# Patient Record
Sex: FEMALE | Race: WHITE
[De-identification: names, ages, dates, MRNs, and addresses within clinical notes are randomized per-mention and may not be internally consistent; named-entity substitution may affect disease eponyms.]

---

## 2017-06-13 ENCOUNTER — HOSPITAL ENCOUNTER (OUTPATIENT)
Dept: HOSPITAL 61 - PNCL | Age: 62
Discharge: HOME | End: 2017-06-13
Attending: ANESTHESIOLOGY
Payer: COMMERCIAL

## 2017-06-13 DIAGNOSIS — Z88.5: ICD-10-CM

## 2017-06-13 DIAGNOSIS — K21.9: ICD-10-CM

## 2017-06-13 DIAGNOSIS — Z88.6: ICD-10-CM

## 2017-06-13 DIAGNOSIS — Z72.89: ICD-10-CM

## 2017-06-13 DIAGNOSIS — I10: ICD-10-CM

## 2017-06-13 DIAGNOSIS — M19.90: ICD-10-CM

## 2017-06-13 DIAGNOSIS — M96.1: ICD-10-CM

## 2017-06-13 DIAGNOSIS — M51.16: Primary | ICD-10-CM

## 2017-06-13 PROCEDURE — 62323 NJX INTERLAMINAR LMBR/SAC: CPT

## 2017-06-14 NOTE — PAIN
DATE OF SERVICE:  06/13/2017



INITIAL CONSULTATION FOR PAIN CLINIC



CHIEF COMPLAINT:  Low back and left lower extremity pain.



HISTORY OF PRESENT ILLNESS:  This is a 61-year-old female who presents with

history of pain for about 2 months, increasing gradually, not a result of any

specific injury or action that she is aware of, but  ____ over time with

increased activity.  She has noticed increased pain in the low back initially

and then it started radiating to the left posterior gluteus, posterior thigh,

posterior lateral thigh and posterior calf.  Intermittently, the patient reports

it is also on the lateral lower leg, which is painful.  It is sharp, stabbing,

throbbing in quality, radiating pain, intermittent in intensity, but is becoming

more and more constant over time.  The patient has had some therapies in the

past and doing some exercises on her own off and on, but she stays very active,

playing golf, does a lot of bowling and other activities and this is beginning

to impede them to some extent.  The patient reports she has tried

cyclobenzaprine as well as Meloxicam and gabapentin, which helps, but only to a

moderate extent, mostly the Meloxicam.  The patient reports no loss of motor

function, no other complaints, but has been awakening her from sleep about once

or twice at night, but not every night.  It does not affect her bowel or bladder

control, slows her ability to walk, but does not impede it.  She is not using

any assistive device to ambulate such as canes or walkers.  The patient did have

previous lumbar laminectomy at the L5-S1 level on the right about 3 years ago. 

The patient reports her disability rating from 0-10, 10 being the worst, as a

3-6 with family and home responsibilities, also recreation, 3-4 with social

activity, 0-1 with occupation, 0-2 with sexual behavior, 0-3 with self-care and

1/10 with life support activities.  The patient did have a recent MRI scan of

the lumbar spine dated 05/18/2017 showing multilevel disk bulges of annular

tears in the neural foraminal region, lateral recess locations L3-L4 and L2-L3

in the left, postoperative changes posteriorly on the right at L5-S1 without

significant ____ scar tissue noted with mild to moderate bulging of L5-S1 disk

and L4-L5 disk space narrowing and mild bulging identified as well.



PAST MEDICAL HISTORY:  Significant for gastroesophageal reflux, hypertension and

arthritis.



PREVIOUS SURGERY:  Include lumbar laminectomy, left knee meniscus repair, right

carpal tunnel release and trigger thumbs release bilaterally.



CURRENT MEDICATIONS:  Include cyclobenzaprine, gabapentin, meloxicam,

esomeprazole, and Tylenol.



ALLERGIES:  THE PATIENT IS ALLERGIC TO TRAMADOL AND HYDROCODONE, BOTH OF WHICH

CAUSE HIVES.



FAMILY HISTORY:  Significant for kidney failure in the patient's mother and

heart disease in the patient's father.



SOCIAL HISTORY:  The patient drinks alcohol about once a week on average, does

not smoke.  She is , lives with her spouse.  No children living at home,

lives locally in [place], [place].



REVIEW OF SYSTEMS:  The patient's review of systems is positive for those items

mentioned in the history of present illness.  All systems reviewed and otherwise

negative.  It is complete, full and well documented on the patient's chart.



PHYSICAL EXAMINATION:

VITAL SIGNS:  The patient's blood pressure 162/97, pulse 77, respirations 18,

temperature 98.6 degrees Fahrenheit, height is 5 feet 6-1/2 inches, weight 166

pounds.

GENERAL:  The patient is awake, alert, oriented, appropriate, very pleasant

demeanor.

HEENT:  Head shows normocephalic, atraumatic.  Extraocular movements are intact,

symmetrical.  Oral cavity, mucous membranes are moist and pink.  Dentition is

intact.

NECK:  Shows anterior throat supple without palpable lymphadenopathy noted. 

Swallow reflex is symmetrical.

CHEST:  Shows normal on inspection.  Breath sounds clear to auscultation

bilaterally.

HEART:  Shows S1 and S2 clear.  No murmurs auscultated.

ABDOMEN:  Soft, nontender, nondistended.  No palpable organomegaly.  No rebound

or guarding demonstrated.

BACK:  Shows spine grossly in the midline, normal-appearing cervical lordotic

curvature, thoracic kyphotic curvature and some slight flattening of lumbar

lordotic curvature with well-healed surgical scar noted in the lumbar

distribution.  Lumbar paraspinous musculature shows symmetrical on inspection

with palpation shows some very mild tenderness, but only diffusely in the lower

lumbar distribution bilaterally and the paraspinous distribution, but without

radiation.  No tenderness over the spinous processes, sacrum or sacroiliac

regions.  The patient shows full rotational motion both laterally greater than

10 degrees right and left as well as extension greater than 10 degrees, forward

flexion 45 degrees without exacerbation of pain.  Lower extremities show deep

tendon reflexes at 2+ in the patellar, 1+ tendo-calcaneus tendons are equal. 

Motor exam is strong with 5/5 dorsiflexion, extension, quadriceps and hamstring

flexion and are symmetrical.  Peripheral pulses are 1+ posterior tibial and

dorsalis pedis pulses.  No peripheral edema is noted.  No clubbing, no cyanosis.

 Lower extremities are warm and dry to touch, equal in color and appearance. 

Straight leg raise noted to be negative for reproduction of any radicular

symptoms bilaterally.  Gaenslen's and Ruy's maneuvers are negative

bilaterally as well.  The patient is able to stand, stand on her toes without

difficulty or loss of balance.  She is able to walk without difficulty and does

not appear to favor the right or left lower extremity, not using any assistive

devices to ambulate.



IMPRESSION:

1.  This is a 61-year-old female with approximately 2-month history of

increasing pain in low back, left lower extremity in a radicular fashion.

2.  MRI scan as noted.

3.  History of hypertension.

4.  History of arthritis.



PLAN:  Options were discussed with the patient including conservative medical

management, physical therapy, interventional techniques and she would like to

pursue interventional techniques.  We discussed a lumbar epidural steroid

injection with a caudal approach using  description as well as anatomical models

to describe the procedure.  Risks were then discussed including, but not limited

to, bleeding, infection, possibility of epidural hematoma, subsequent neurologic

compromise, dural puncture, headaches, spinal cord and/or nerve damage, side

effects of steroid medication and poor results regarding pain control.  The

patient understands and wishes to proceed.  The patient will return to clinic in

approximately 2 weeks for followup, was counseled on return appointment,

activity level and side effects to be aware of.



DIAGNOSES:  Lumbar radiculopathy with lumbar degenerative disk disease and

post-lumbar laminectomy syndrome.



PROCEDURE:  Caudal approach epidural steroid injection using C-arm fluoroscopic

guidance under sterile prep and drape using local anesthetic.



MEDICATION INJECTED:  120 mg Depo-Medrol plus 10 mL of preservative-free normal

saline and 2 mL of Isovue contrast.



CONDITION AT DISCHARGE:  Stable.  The patient tolerated the procedure well, had

no complications.

 



______________________________

STAR NELSON MD



DR:  JEROD/silvano  JOB#:  [id number] / [id number]

DD:  06/13/2017 09:05  DT:  06/14/2017 01:45



SHAILESH Waite MD

## 2017-07-12 ENCOUNTER — HOSPITAL ENCOUNTER (OUTPATIENT)
Dept: HOSPITAL 61 - PNCL | Age: 62
Discharge: HOME | End: 2017-07-12
Attending: ANESTHESIOLOGY
Payer: COMMERCIAL

## 2017-07-12 DIAGNOSIS — M96.1: ICD-10-CM

## 2017-07-12 DIAGNOSIS — M19.90: ICD-10-CM

## 2017-07-12 DIAGNOSIS — I10: ICD-10-CM

## 2017-07-12 DIAGNOSIS — Z72.89: ICD-10-CM

## 2017-07-12 DIAGNOSIS — K21.9: ICD-10-CM

## 2017-07-12 DIAGNOSIS — M51.16: Primary | ICD-10-CM

## 2017-07-12 DIAGNOSIS — Z91.048: ICD-10-CM

## 2017-07-12 PROCEDURE — 62323 NJX INTERLAMINAR LMBR/SAC: CPT

## 2018-01-12 ENCOUNTER — HOSPITAL ENCOUNTER (OUTPATIENT)
Dept: HOSPITAL 63 - MAMMO | Age: 63
Discharge: HOME | End: 2018-01-12
Attending: SPECIALIST
Payer: COMMERCIAL

## 2018-01-12 DIAGNOSIS — Z12.31: Primary | ICD-10-CM

## 2018-01-12 PROCEDURE — 77067 SCR MAMMO BI INCL CAD: CPT

## 2018-01-12 PROCEDURE — 77063 BREAST TOMOSYNTHESIS BI: CPT

## 2018-01-12 NOTE — RAD
DATE: 1/12/2018



EXAM: MAMMO PEBBLES SCREENING BILATERAL



Bilateral digital screening mammography to include digital breast

tomosynthesis (3D mammography) 



HISTORY: Screening study.



COMPARISON: 2/22/2016



This study was interpreted with the benefit of Computerized Aided Detection

(CAD).







The breast parenchyma is heterogeneously dense, which could reduce sensitivity

of mammography. Breast parenchyma level C.







FINDINGS: Digital MLO and CC mammograms of both breasts were obtained.

Additionally digital breast tomosynthesis (3D mammography) images of both

breasts in the MLO and CC projections were performed. Comparison study is

dated 2/22/2016.



The breast parenchyma is heterogeneously dense which can obscure a lesion on

mammography (breast density code C). No spiculated mass is seen. No malignant

appearing calcification or area of architectural distortion is noted. 

Benign-appearing vascular calcifications are seen within both breasts.



Digital breast tomosynthesis images demonstrate no spiculated mass or

malignant appearing calcification.



Since the previous examination there has been no significant interval change.







IMPRESSION: BI-RADS Category 1, negative.  There is no mammographic evidence

of malignancy.  Routine yearly screening mammography is recommended for

follow-up.







BI-RADS CATEGORY: 1 NEGATIVE



RECOMMENDED FOLLOW-UP: 12M 12 MONTH FOLLOW-UP



PQRS compliance statement: Patient information was entered into a reminder

system with a target due date 1/12/2019 for the next mammogram.



Mammography is a sensitive method for finding small breast cancers, but it

does not detect them all and is not a substitute for careful clinical

examination.  A negative mammogram does not negate a clinically suspicious

finding and should not result in delay in biopsying a clinically suspicious

abnormality.



"Our facility is accredited by the American [employer] Mammography

Program."

## 2018-01-30 ENCOUNTER — HOSPITAL ENCOUNTER (OUTPATIENT)
Dept: HOSPITAL 63 - CT | Age: 63
Discharge: HOME | End: 2018-01-30
Attending: NURSE PRACTITIONER
Payer: COMMERCIAL

## 2018-01-30 DIAGNOSIS — R41.89: Primary | ICD-10-CM

## 2018-01-30 DIAGNOSIS — R41.82: ICD-10-CM

## 2018-01-30 LAB
ALBUMIN SERPL-MCNC: 4 G/DL (ref 3.4–5)
ALBUMIN/GLOB SERPL: 1.1 {RATIO} (ref 1–1.7)
ALP SERPL-CCNC: 100 U/L (ref 46–116)
ALT SERPL-CCNC: 50 U/L (ref 14–59)
ANION GAP SERPL CALC-SCNC: 9 MMOL/L (ref 6–14)
AST SERPL-CCNC: 18 U/L (ref 15–37)
BASOPHILS # BLD AUTO: 0 X10^3/UL (ref 0–0.2)
BASOPHILS NFR BLD: 1 % (ref 0–3)
BILIRUB SERPL-MCNC: 0.6 MG/DL (ref 0.2–1)
BUN/CREAT SERPL: 24 (ref 6–20)
CA-I SERPL ISE-MCNC: 17 MG/DL (ref 7–20)
CALCIUM SERPL-MCNC: 9.4 MG/DL (ref 8.5–10.1)
CHLORIDE SERPL-SCNC: 105 MMOL/L (ref 98–107)
CO2 SERPL-SCNC: 29 MMOL/L (ref 21–32)
CREAT SERPL-MCNC: 0.7 MG/DL (ref 0.6–1)
EOSINOPHIL NFR BLD: 0.1 X10^3/UL (ref 0–0.7)
EOSINOPHIL NFR BLD: 2 % (ref 0–3)
ERYTHROCYTE [DISTWIDTH] IN BLOOD BY AUTOMATED COUNT: 12.8 % (ref 11.5–14.5)
GFR SERPLBLD BASED ON 1.73 SQ M-ARVRAT: 85.4 ML/MIN
GLOBULIN SER-MCNC: 3.7 G/DL (ref 2.2–3.8)
GLUCOSE SERPL-MCNC: 96 MG/DL (ref 70–99)
HCT VFR BLD CALC: 41.9 % (ref 36–47)
HGB BLD-MCNC: 14.6 G/DL (ref 12–15.5)
LYMPHOCYTES # BLD: 2 X10^3/UL (ref 1–4.8)
LYMPHOCYTES NFR BLD AUTO: 30 % (ref 24–48)
MCH RBC QN AUTO: 32 PG (ref 25–35)
MCHC RBC AUTO-ENTMCNC: 35 G/DL (ref 31–37)
MCV RBC AUTO: 91 FL (ref 79–100)
MONO #: 0.4 X10^3/UL (ref 0–1.1)
MONOCYTES NFR BLD: 6 % (ref 0–9)
NEUT #: 4.1 X10^3UL (ref 1.8–7.7)
NEUTROPHILS NFR BLD AUTO: 62 % (ref 31–73)
PLATELET # BLD AUTO: 242 X10^3/UL (ref 140–400)
POTASSIUM SERPL-SCNC: 3.9 MMOL/L (ref 3.5–5.1)
PROT SERPL-MCNC: 7.7 G/DL (ref 6.4–8.2)
RBC # BLD AUTO: 4.62 X10^6/UL (ref 3.5–5.4)
SODIUM SERPL-SCNC: 143 MMOL/L (ref 136–145)
WBC # BLD AUTO: 6.6 X10^3/UL (ref 4–11)

## 2018-01-30 PROCEDURE — 36415 COLL VENOUS BLD VENIPUNCTURE: CPT

## 2018-01-30 PROCEDURE — 85025 COMPLETE CBC W/AUTO DIFF WBC: CPT

## 2018-01-30 PROCEDURE — 70450 CT HEAD/BRAIN W/O DYE: CPT

## 2018-01-30 PROCEDURE — 80053 COMPREHEN METABOLIC PANEL: CPT

## 2018-01-30 NOTE — RAD
Clinical indications: Mental status change.  Decrease in cognitive function

and awareness.



Technique: Noncontrast axial cross sectional scanning of the head was

performed. 

PQRS Compliance Statement:



One or more of the following individualized dose reduction techniques were

utilized for this examination:

1. Automated exposure control

2. Adjustment of the mA and/or kV according to patient size

3. Use of iterative reconstruction technique



Findings: No acute intracranial hemorrhage or midline shift or mass-effect or

hydrocephalus or extra-axial fluid collection is seen. No focal hypodense area

or sulci effacement is seen to indicate an acute infarct or edema

radiographically.  No skull fracture or pneumocephalus is seen. No

opacification of the mastoid sinuses or the paranasal sinuses is seen. The

maxillary sinuses are not completely seen in this study.



Impression:  No acute intracranial abnormality is seen.

## 2018-02-09 ENCOUNTER — HOSPITAL ENCOUNTER (OUTPATIENT)
Dept: HOSPITAL 63 - SURG | Age: 63
Discharge: HOME | End: 2018-02-09
Attending: ANESTHESIOLOGY
Payer: COMMERCIAL

## 2018-02-09 DIAGNOSIS — I10: ICD-10-CM

## 2018-02-09 DIAGNOSIS — M54.16: Primary | ICD-10-CM

## 2018-02-09 DIAGNOSIS — Z98.890: ICD-10-CM

## 2018-02-09 DIAGNOSIS — Z72.89: ICD-10-CM

## 2018-02-09 PROCEDURE — 64484 NJX AA&/STRD TFRM EPI L/S EA: CPT

## 2018-02-09 PROCEDURE — 64483 NJX AA&/STRD TFRM EPI L/S 1: CPT

## 2018-03-14 ENCOUNTER — HOSPITAL ENCOUNTER (OUTPATIENT)
Dept: HOSPITAL 63 - SURG | Age: 63
End: 2018-03-14
Attending: ANESTHESIOLOGY
Payer: COMMERCIAL

## 2018-03-14 DIAGNOSIS — I10: ICD-10-CM

## 2018-03-14 DIAGNOSIS — M54.16: Primary | ICD-10-CM

## 2018-03-14 DIAGNOSIS — K21.9: ICD-10-CM

## 2018-03-14 PROCEDURE — 64483 NJX AA&/STRD TFRM EPI L/S 1: CPT

## 2018-03-14 PROCEDURE — 64484 NJX AA&/STRD TFRM EPI L/S EA: CPT

## 2018-06-18 ENCOUNTER — HOSPITAL ENCOUNTER (OUTPATIENT)
Dept: HOSPITAL 61 - SURGPAT | Age: 63
Discharge: HOME | End: 2018-06-18
Attending: NEUROLOGICAL SURGERY
Payer: COMMERCIAL

## 2018-06-18 DIAGNOSIS — M54.16: ICD-10-CM

## 2018-06-18 DIAGNOSIS — M48.061: ICD-10-CM

## 2018-06-18 DIAGNOSIS — Z01.818: Primary | ICD-10-CM

## 2018-06-18 LAB
ADD MAN DIFF?: NO
ALBUMIN SERPL-MCNC: 3.9 G/DL (ref 3.4–5)
ALBUMIN/GLOB SERPL: 1.1 {RATIO} (ref 1–1.7)
ALP SERPL-CCNC: 89 U/L (ref 46–116)
ALT (SGPT): 69 U/L (ref 14–59)
ANION GAP SERPL CALC-SCNC: 13 MMOL/L (ref 6–14)
AST SERPL-CCNC: 23 U/L (ref 15–37)
BASO #: 0 X10^3/UL (ref 0–0.2)
BASO %: 1 % (ref 0–3)
BLOOD UREA NITROGEN: 14 MG/DL (ref 7–20)
BUN/CREAT SERPL: 16 (ref 6–20)
CALCIUM: 9.1 MG/DL (ref 8.5–10.1)
CHLORIDE: 97 MMOL/L (ref 98–107)
CO2 SERPL-SCNC: 28 MMOL/L (ref 21–32)
CREAT SERPL-MCNC: 0.9 MG/DL (ref 0.6–1)
EOS #: 0.1 X10^3/UL (ref 0–0.7)
EOS %: 2 % (ref 0–3)
GFR SERPLBLD BASED ON 1.73 SQ M-ARVRAT: 63.9 ML/MIN
GLOBULIN SER-MCNC: 3.4 G/DL (ref 2.2–3.8)
GLUCOSE SERPL-MCNC: 86 MG/DL (ref 70–99)
HCG SERPL-ACNC: 5.8 X10^3/UL (ref 4–11)
HEMATOCRIT: 38.9 % (ref 36–47)
HEMOGLOBIN: 13.7 G/DL (ref 12–15.5)
LYMPH #: 1.6 X10^3/UL (ref 1–4.8)
LYMPH %: 27 % (ref 24–48)
MEAN CORPUSCULAR HEMOGLOBIN: 33 PG (ref 25–35)
MEAN CORPUSCULAR HGB CONC: 35 G/DL (ref 31–37)
MEAN CORPUSCULAR VOLUME: 93 FL (ref 79–100)
MONO #: 0.4 X10^3/UL (ref 0–1.1)
MONO %: 7 % (ref 0–9)
NEUT #: 3.7 X10^3UL (ref 1.8–7.7)
NEUT %: 64 % (ref 31–73)
PLATELET COUNT: 250 X10^3/UL (ref 140–400)
POTASSIUM SERPL-SCNC: 3.9 MMOL/L (ref 3.5–5.1)
RED BLOOD COUNT: 4.19 X10^6/UL (ref 3.5–5.4)
RED CELL DISTRIBUTION WIDTH: 12.8 % (ref 11.5–14.5)
SODIUM: 138 MMOL/L (ref 136–145)
TOTAL BILIRUBIN: 0.8 MG/DL (ref 0.2–1)
TOTAL PROTEIN: 7.3 G/DL (ref 6.4–8.2)

## 2018-06-18 PROCEDURE — 85025 COMPLETE CBC W/AUTO DIFF WBC: CPT

## 2018-06-18 PROCEDURE — 87641 MR-STAPH DNA AMP PROBE: CPT

## 2018-06-18 PROCEDURE — 80053 COMPREHEN METABOLIC PANEL: CPT

## 2018-06-18 PROCEDURE — 36415 COLL VENOUS BLD VENIPUNCTURE: CPT

## 2018-07-09 ENCOUNTER — HOSPITAL ENCOUNTER (OUTPATIENT)
Dept: HOSPITAL 61 - SURG | Age: 63
Setting detail: OBSERVATION
LOS: 1 days | Discharge: HOME | End: 2018-07-10
Attending: NEUROLOGICAL SURGERY | Admitting: NEUROLOGICAL SURGERY
Payer: COMMERCIAL

## 2018-07-09 DIAGNOSIS — M48.07: Primary | ICD-10-CM

## 2018-07-09 DIAGNOSIS — M54.17: ICD-10-CM

## 2018-07-09 DIAGNOSIS — I10: ICD-10-CM

## 2018-07-09 DIAGNOSIS — Z82.49: ICD-10-CM

## 2018-07-09 DIAGNOSIS — K21.9: ICD-10-CM

## 2018-07-09 PROCEDURE — 88311 DECALCIFY TISSUE: CPT

## 2018-07-09 PROCEDURE — 88304 TISSUE EXAM BY PATHOLOGIST: CPT

## 2018-07-09 PROCEDURE — 76000 FLUOROSCOPY <1 HR PHYS/QHP: CPT

## 2018-07-09 PROCEDURE — 97161 PT EVAL LOW COMPLEX 20 MIN: CPT

## 2018-07-09 PROCEDURE — 63047 LAM FACETEC & FORAMOT LUMBAR: CPT

## 2018-07-09 RX ADMIN — DOCUSATE SODIUM 1 MG: 100 CAPSULE, LIQUID FILLED ORAL at 20:54

## 2018-07-09 RX ADMIN — THROMBIN, TOPICAL (BOVINE) 1 UNIT: KIT at 09:23

## 2018-07-09 RX ADMIN — PROCHLORPERAZINE EDISYLATE 1 MG: 5 INJECTION INTRAMUSCULAR; INTRAVENOUS at 11:58

## 2018-07-09 RX ADMIN — DEXTROSE, SODIUM CHLORIDE, AND POTASSIUM CHLORIDE 1 MLS/HR: 5; .45; .15 INJECTION INTRAVENOUS at 13:58

## 2018-07-09 RX ADMIN — BUPIVACAINE HYDROCHLORIDE AND EPINEPHRINE BITARTRATE 1 ML: 5; .005 INJECTION, SOLUTION EPIDURAL; INTRACAUDAL; PERINEURAL at 09:23

## 2018-07-09 RX ADMIN — SCOPOLAMINE 1 PATCH: 1 PATCH, EXTENDED RELEASE TRANSDERMAL at 08:13

## 2018-07-09 RX ADMIN — GELATIN ABSORBABLE SPONGE SIZE 100 1 EACH: MISC at 09:23

## 2018-07-09 RX ADMIN — METHOCARBAMOL 1 MG: 750 TABLET ORAL at 12:15

## 2018-07-09 RX ADMIN — SODIUM CHLORIDE, SODIUM LACTATE, POTASSIUM CHLORIDE, AND CALCIUM CHLORIDE 1 MLS/HR: .6; .31; .03; .02 INJECTION, SOLUTION INTRAVENOUS at 07:43

## 2018-07-09 RX ADMIN — PANTOPRAZOLE SODIUM 1 MG: 40 TABLET, DELAYED RELEASE ORAL at 16:30

## 2018-07-09 RX ADMIN — KETOROLAC TROMETHAMINE 1 MG: 30 INJECTION, SOLUTION INTRAMUSCULAR at 09:23

## 2018-07-09 RX ADMIN — CITALOPRAM HYDROBROMIDE 1 MG: 20 TABLET ORAL at 15:00

## 2018-07-09 RX ADMIN — BACITRACIN 1: 5000 INJECTION, POWDER, FOR SOLUTION INTRAMUSCULAR at 09:23

## 2018-07-09 RX ADMIN — SODIUM CHLORIDE, SODIUM LACTATE, POTASSIUM CHLORIDE, AND CALCIUM CHLORIDE 1 MLS/HR: .6; .31; .03; .02 INJECTION, SOLUTION INTRAVENOUS at 11:58

## 2018-07-10 VITALS — SYSTOLIC BLOOD PRESSURE: 99 MMHG | DIASTOLIC BLOOD PRESSURE: 59 MMHG

## 2018-07-10 RX ADMIN — ACETAMINOPHEN 1 MG: 325 TABLET, FILM COATED ORAL at 03:26

## 2018-07-10 RX ADMIN — CITALOPRAM HYDROBROMIDE 1 MG: 20 TABLET ORAL at 09:03

## 2018-07-10 RX ADMIN — PANTOPRAZOLE SODIUM 1 MG: 40 TABLET, DELAYED RELEASE ORAL at 07:00

## 2018-07-10 RX ADMIN — ACETAMINOPHEN 1 MG: 325 TABLET, FILM COATED ORAL at 09:58

## 2018-07-10 RX ADMIN — DOCUSATE SODIUM 1 MG: 100 CAPSULE, LIQUID FILLED ORAL at 09:03

## 2019-09-24 ENCOUNTER — HOSPITAL ENCOUNTER (OUTPATIENT)
Dept: HOSPITAL 63 - MAMMO | Age: 64
Discharge: HOME | End: 2019-09-24
Attending: SPECIALIST
Payer: COMMERCIAL

## 2019-09-24 DIAGNOSIS — Z12.31: Primary | ICD-10-CM

## 2019-09-24 PROCEDURE — 77067 SCR MAMMO BI INCL CAD: CPT

## 2019-09-24 PROCEDURE — 77063 BREAST TOMOSYNTHESIS BI: CPT

## 2019-09-25 NOTE — RAD
DATE: 9/24/2019



EXAM: MAMMO PEBBLES SCREENING BILATERAL



HISTORY: Routine screening



COMPARISON: 2/22/2016 and 1/12/2018 mammographic exams



This study was interpreted with the benefit of Computerized Aided Detection

(CAD).





Breast Density: SCATTERED The breast parenchyma shows scattered fibroglandular

densities. Breast parenchyma level B.





FINDINGS: No suspicious calcification, mass, or distortion.  





IMPRESSION: Stable







BI-RADS CATEGORY: 1 NEGATIVE



RECOMMENDED FOLLOW-UP: 12M 12 MONTH FOLLOW-UP



PQRS compliance statement: Patient information was entered into a reminder

system with a target due date for the next mammogram.



Mammography is a sensitive method for finding small breast cancers, but it

does not detect them all and is not a substitute for careful clinical

examination.  A negative mammogram does not negate a clinically suspicious

finding and should not result in delay in biopsying a clinically suspicious

abnormality.



"Our facility is accredited by the American [employer] Mammography

Program."

## 2020-01-09 ENCOUNTER — HOSPITAL ENCOUNTER (OUTPATIENT)
Dept: HOSPITAL 63 - DXRAD | Age: 65
Discharge: HOME | End: 2020-01-09
Attending: PHYSICIAN ASSISTANT
Payer: COMMERCIAL

## 2020-01-09 DIAGNOSIS — J06.9: Primary | ICD-10-CM

## 2020-01-09 PROCEDURE — 71046 X-RAY EXAM CHEST 2 VIEWS: CPT

## 2020-01-09 NOTE — RAD
EXAM: Chest, 2 views.

 

HISTORY: Acute respiratory infection.

 

COMPARISON: None.

 

FINDINGS: 2 views of the chest are obtained. There is no infiltrate, 

pleural effusion or pneumothorax. The heart is normal in size.

 

IMPRESSION: No acute pulmonary finding.

 

Electronically signed by: Cydney Alvarado MD (1/9/2020 4:51 PM) [place] [id number]

## 2020-01-30 ENCOUNTER — HOSPITAL ENCOUNTER (INPATIENT)
Dept: HOSPITAL 63 - ER | Age: 65
LOS: 3 days | Discharge: HOME | DRG: 195 | End: 2020-02-02
Attending: INTERNAL MEDICINE | Admitting: INTERNAL MEDICINE
Payer: COMMERCIAL

## 2020-01-30 VITALS — WEIGHT: 171.5 LBS | HEIGHT: 66 IN | BODY MASS INDEX: 27.56 KG/M2

## 2020-01-30 VITALS — DIASTOLIC BLOOD PRESSURE: 62 MMHG | SYSTOLIC BLOOD PRESSURE: 112 MMHG

## 2020-01-30 DIAGNOSIS — K21.9: ICD-10-CM

## 2020-01-30 DIAGNOSIS — Z88.8: ICD-10-CM

## 2020-01-30 DIAGNOSIS — J18.9: Primary | ICD-10-CM

## 2020-01-30 DIAGNOSIS — G56.01: ICD-10-CM

## 2020-01-30 DIAGNOSIS — I10: ICD-10-CM

## 2020-01-30 LAB
ALBUMIN SERPL-MCNC: 4.4 G/DL (ref 3.4–5)
ALBUMIN/GLOB SERPL: 1.2 {RATIO} (ref 1–1.7)
ALP SERPL-CCNC: 92 U/L (ref 46–116)
ALT SERPL-CCNC: 40 U/L (ref 14–59)
ANION GAP SERPL CALC-SCNC: 11 MMOL/L (ref 6–14)
AST SERPL-CCNC: 44 U/L (ref 15–37)
BASOPHILS # BLD AUTO: 0 X10^3/UL (ref 0–0.2)
BASOPHILS NFR BLD: 0 % (ref 0–3)
BILIRUB SERPL-MCNC: 1.1 MG/DL (ref 0.2–1)
BUN/CREAT SERPL: 17 (ref 6–20)
CA-I SERPL ISE-MCNC: 19 MG/DL (ref 7–20)
CALCIUM SERPL-MCNC: 9.6 MG/DL (ref 8.5–10.1)
CHLORIDE SERPL-SCNC: 102 MMOL/L (ref 98–107)
CO2 SERPL-SCNC: 24 MMOL/L (ref 21–32)
CREAT SERPL-MCNC: 1.1 MG/DL (ref 0.6–1)
EOSINOPHIL NFR BLD: 0 % (ref 0–3)
EOSINOPHIL NFR BLD: 0 X10^3/UL (ref 0–0.7)
ERYTHROCYTE [DISTWIDTH] IN BLOOD BY AUTOMATED COUNT: 12.6 % (ref 11.5–14.5)
GFR SERPLBLD BASED ON 1.73 SQ M-ARVRAT: 50.3 ML/MIN
GLOBULIN SER-MCNC: 3.8 G/DL (ref 2.2–3.8)
GLUCOSE SERPL-MCNC: 100 MG/DL (ref 70–99)
HCT VFR BLD CALC: 43.9 % (ref 36–47)
HGB BLD-MCNC: 14.7 G/DL (ref 12–15.5)
INFLUENZA A PATIENT: NEGATIVE
INFLUENZA B PATIENT: NEGATIVE
LYMPHOCYTES # BLD: 1.1 X10^3/UL (ref 1–4.8)
LYMPHOCYTES NFR BLD AUTO: 11 % (ref 24–48)
MCH RBC QN AUTO: 32 PG (ref 25–35)
MCHC RBC AUTO-ENTMCNC: 34 G/DL (ref 31–37)
MCV RBC AUTO: 94 FL (ref 79–100)
MONO #: 0.4 X10^3/UL (ref 0–1.1)
MONOCYTES NFR BLD: 4 % (ref 0–9)
NEUT #: 8.5 X10^3UL (ref 1.8–7.7)
NEUTROPHILS NFR BLD AUTO: 85 % (ref 31–73)
PLATELET # BLD AUTO: 237 X10^3/UL (ref 140–400)
POTASSIUM SERPL-SCNC: 3.8 MMOL/L (ref 3.5–5.1)
PROT SERPL-MCNC: 8.2 G/DL (ref 6.4–8.2)
RBC # BLD AUTO: 4.66 X10^6/UL (ref 3.5–5.4)
SODIUM SERPL-SCNC: 137 MMOL/L (ref 136–145)
WBC # BLD AUTO: 10.1 X10^3/UL (ref 4–11)

## 2020-01-30 PROCEDURE — 83605 ASSAY OF LACTIC ACID: CPT

## 2020-01-30 PROCEDURE — 87070 CULTURE OTHR SPECIMN AEROBIC: CPT

## 2020-01-30 PROCEDURE — 85025 COMPLETE CBC W/AUTO DIFF WBC: CPT

## 2020-01-30 PROCEDURE — 87804 INFLUENZA ASSAY W/OPTIC: CPT

## 2020-01-30 PROCEDURE — 80048 BASIC METABOLIC PNL TOTAL CA: CPT

## 2020-01-30 PROCEDURE — 87040 BLOOD CULTURE FOR BACTERIA: CPT

## 2020-01-30 PROCEDURE — 85027 COMPLETE CBC AUTOMATED: CPT

## 2020-01-30 PROCEDURE — 36415 COLL VENOUS BLD VENIPUNCTURE: CPT

## 2020-01-30 PROCEDURE — 80053 COMPREHEN METABOLIC PANEL: CPT

## 2020-01-30 PROCEDURE — 83735 ASSAY OF MAGNESIUM: CPT

## 2020-01-30 PROCEDURE — 71046 X-RAY EXAM CHEST 2 VIEWS: CPT

## 2020-01-30 PROCEDURE — 96361 HYDRATE IV INFUSION ADD-ON: CPT

## 2020-01-30 PROCEDURE — 94640 AIRWAY INHALATION TREATMENT: CPT

## 2020-01-30 PROCEDURE — 87205 SMEAR GRAM STAIN: CPT

## 2020-01-30 PROCEDURE — 96365 THER/PROPH/DIAG IV INF INIT: CPT

## 2020-01-30 PROCEDURE — 96375 TX/PRO/DX INJ NEW DRUG ADDON: CPT

## 2020-01-30 RX ADMIN — BENZONATATE PRN MG: 100 CAPSULE, LIQUID FILLED ORAL at 23:51

## 2020-01-30 NOTE — NUR
The patient, RACHELL OZUNA, 65 y/o, F admitted by GAURI AWN MD, arrived on the floor via 
 EMS on a gurney . Pt was given written information regarding hospital policies, unit 
procedures and contact persons.  



Valuables were checked and documented. Pts vitals were taken. pt has a complaint of being 
soa and unable to rest due to coughing. pt reveived  tessalon perles and benadryl per dr 
orders. pt is currently sleeping. will continue to monitor.

## 2020-01-30 NOTE — RAD
Chest, PA and Lateral:

 

Technique:  PA and lateral views of the chest were obtained.

 

History:  Cough.

 

Comparison: 1/9/2020.

 

Findings:

 

The cardiomediastinal silhouette grossly appears unremarkable. Mild right 

lung base airspace opacities likely atelectasis or infiltrates.

 

IMPRESSION:

 

1. Mild airspace opacities identified in the right lung base likely 

pneumonia or atelectasis. Follow-up to resolution.

 

Electronically signed by: Ghanshyam Tomas MD (1/30/2020 6:27 PM) [id number]

## 2020-01-30 NOTE — PHYS DOC
Past History


Past Medical History:  Hypertension


Smoking:  Non-smoker





Adult General


Chief Complaint


Chief Complaint:  FLU SYMPTOM





HPI


HPI


Patient is a 64-year-old female who presents to the emergency department for 

evaluation. She states for the past 3 days, she has had flulike symptoms, with a

cough, congestion, myalgias, and fevers. She has had some nausea, decreased 

appetite, dizziness, but no vomiting. She has had some mild diarrhea. She denies

any focal pain other than diffuse myalgias. She has not had any significant 

shortness of breath. She denies any otalgia, or sore throat. She has taken 

Tylenol for her symptoms, last dose about 3 hours prior to arrival. There are no

alleviating or exacerbating factors to her symptoms.





Review of Systems


Review of Systems





Constitutional: Denies fever or chills []


Eyes: Denies change in visual acuity, redness, or eye pain []


HENT: Denies otalgia or sore throat []


Respiratory: Denies shortness of breath []


Cardiovascular:The patient denies any shortness of breath, chest pain, 

palpitations, or orthopnea []


GI: Denies abdominal pain, vomiting, bloody stools  []


: Denies dysuria or hematuria []


Musculoskeletal: Denies back pain or joint pain []


Integument: Denies rash or skin lesions []


Neurologic: Denies headache, focal weakness or sensory changes []


Endocrine: Denies polyuria or polydipsia []





All other systems were reviewed and found to be within normal limits, except as 

documented in this note.





Current Medications


Current Medications





Current Medications








 Medications


  (Trade)  Dose


 Ordered  Sig/Vinod  Start Time


 Stop Time Status Last Admin


Dose Admin


 


 Sodium Chloride  1,000 ml @ 


 1,000 mls/hr  Q1H  1/30/20 18:08


 1/30/20 19:07 UNV  














Allergies


Allergies





Allergies








Coded Allergies Type Severity Reaction Last Updated Verified


 


  hydrocodone Allergy Unknown  1/30/20 Yes


 


  tramadol Allergy Unknown  1/30/20 Yes











Physical Exam


Physical Exam


PHYSICAL EXAM:





CONSTITUTIONAL: Well developed, well nourished


HEAD: normocephalic, atraumatic


EENT: PERRL, EOMI. Conjunctivae normal color, sclerae non-icteric; moist mucous 

membranes.


NECK: Supple, non-tender; no meningismus.


LUNGS: Mild right middle lung rhonchi, otherwise Lungs CTA, breathing even and 

unlabored. Normal air movement.


HEART: Regular tachycardia, no murmur


CHEST: No deformity; non-tender


ABDOMEN: The abdomen is soft, and non-tender, no masses or bruits.


EXTREM: Normal ROM; no deformity, no calf tenderness. Normal pulses palpable in 

all extremities. There is no pedal edema.


SKIN: No rash; no diaphoresis


NEURO: Alert; normal speech and cognition; CN's grossly intact; strength grossly

 intact without focal deficit.


BACK: No CVA TTP.





Current Patient Data


Lab Results





Laboratory Tests








Test


 1/30/20


18:03 1/30/20


18:26


 


Influenza Type A (Rapid) Negative  


 


Influenza Type B (Rapid) Negative  


 


White Blood Count  10.1 x10^3/uL 


 


Red Blood Count  4.66 x10^6/uL 


 


Hemoglobin  14.7 g/dL 


 


Hematocrit  43.9 % 


 


Mean Corpuscular Volume  94 fL 


 


Mean Corpuscular Hemoglobin  32 pg 


 


Mean Corpuscular Hemoglobin


Concent 


 34 g/dL 





 


Red Cell Distribution Width  12.6 % 


 


Platelet Count  237 x10^3/uL 


 


Neutrophils (%) (Auto)  85 % 


 


Lymphocytes (%) (Auto)  11 % 


 


Monocytes (%) (Auto)  4 % 


 


Eosinophils (%) (Auto)  0 % 


 


Basophils (%) (Auto)  0 % 


 


Neutrophils # (Auto)  8.5 x10^3uL 


 


Lymphocytes # (Auto)  1.1 x10^3/uL 


 


Monocytes # (Auto)  0.4 x10^3/uL 


 


Eosinophils # (Auto)  0.0 x10^3/uL 


 


Basophils # (Auto)  0.0 x10^3/uL 








Current Medications








 Medications


  (Trade)  Dose


 Ordered  Sig/Vinod


 Route


 PRN Reason  Start Time


 Stop Time Status Last Admin


Dose Admin


 


 Sodium Chloride  1,000 ml @ 


 1,000 mls/hr  Q1H


 IV


   1/30/20 18:08


 1/30/20 19:07 DC 1/30/20 18:30





 


 Ceftriaxone


 Sodium 1 gm/


 Sodium Chloride  50 ml @ 


 100 mls/hr  1X  ONCE


 IV


   1/30/20 18:45


 1/30/20 19:14 DC 1/30/20 19:05





 


 Azithromycin 500


 mg/Sodium Chloride  250 ml @ 


 250 mls/hr  1X  ONCE


 IV


   1/30/20 18:45


 1/30/20 19:44 DC 1/30/20 19:06





 


 Sodium Chloride  250 ml @ 


 As Directed  STK-MED ONCE


 .ROUTE


   1/30/20 18:49


 1/30/20 18:49 DC  





 


 Sodium Chloride  50 ml @ As


 Directed  STK-MED ONCE


 .ROUTE


   1/30/20 18:49


 1/30/20 18:49 DC  





 


 Azithromycin


  (Zithromax)  500 mg  STK-MED ONCE


 IV


   1/30/20 18:49


 1/30/20 18:49 DC  





 


 Ceftriaxone Sodium


  (Rocephin)  1 gm  STK-MED ONCE


 .ROUTE


   1/30/20 18:49


 1/30/20 18:49 DC  





 


 Benzonatate


  (Tessalon Perle)  200 mg  1X  ONCE


 PO


   1/30/20 19:30


 1/30/20 19:31 DC 1/30/20 19:24





 


 Sodium Chloride  1,000 ml @ 


 1,000 mls/hr  1X  ONCE


 IV


   1/30/20 19:30


 1/30/20 20:29  1/30/20 19:25














EKG


EKG


[]





Radiology/Procedures


Radiology/Procedures


PROCEDURE: CHEST PA & LATERAL





 


Chest, PA and Lateral:


 


Technique:  PA and lateral views of the chest were obtained.


 


History:  Cough.


 


Comparison: 1/9/2020.


 


Findings:


 


The cardiomediastinal silhouette grossly appears unremarkable. Mild right 


lung base airspace opacities likely atelectasis or infiltrates.


 


IMPRESSION:


 


1. Mild airspace opacities identified in the right lung base likely 


pneumonia or atelectasis. Follow-up to resolution.[]





Course & Med Decision Making


Course & Med Decision Making


Pertinent Labs and Imaging studies reviewed. (See chart for details)





[]





8:05 PM:The patient's  condition remains stable.  I spoke with the hospitalist, 

who accepted the patient to the hospital for further evaluation and treatment. 

There is some delay in obtaining chemistry lactic acid results as the chemistry 

machine is inoperative at this time. Labs are having to be sent to different 

hospital. The patient still feels poorly and looks ill, and will benefit from 

hospitalization and supportive care.





Dragon Disclaimer


Dragon Disclaimer


This electronic medical record was generated, in whole or in part, using a voice

 recognition dictation system.





Departure


Departure:


Impression:  


   Primary Impression:  


   Pneumonia


Disposition:  09 ADMITTED AS INPATIENT


Admitting Physician:  Ranjeet Lindsey


Condition:  STABLE


Referrals:  


SHAILESH OSUNA MD (PCP)











ADDIE LAAN MD           Jan 30, 2020 18:14

## 2020-01-31 VITALS — SYSTOLIC BLOOD PRESSURE: 126 MMHG | DIASTOLIC BLOOD PRESSURE: 77 MMHG

## 2020-01-31 VITALS — SYSTOLIC BLOOD PRESSURE: 93 MMHG | DIASTOLIC BLOOD PRESSURE: 58 MMHG

## 2020-01-31 VITALS — SYSTOLIC BLOOD PRESSURE: 113 MMHG | DIASTOLIC BLOOD PRESSURE: 70 MMHG

## 2020-01-31 VITALS — SYSTOLIC BLOOD PRESSURE: 120 MMHG | DIASTOLIC BLOOD PRESSURE: 72 MMHG

## 2020-01-31 LAB
ALBUMIN SERPL-MCNC: 2.8 G/DL (ref 3.4–5)
ALBUMIN/GLOB SERPL: 0.8 {RATIO} (ref 1–1.7)
ALP SERPL-CCNC: 63 U/L (ref 46–116)
ALT SERPL-CCNC: 42 U/L (ref 14–59)
ANION GAP SERPL CALC-SCNC: 14 MMOL/L (ref 6–14)
AST SERPL-CCNC: 20 U/L (ref 15–37)
BILIRUB SERPL-MCNC: 0.7 MG/DL (ref 0.2–1)
BUN/CREAT SERPL: 14 (ref 6–20)
CA-I SERPL ISE-MCNC: 11 MG/DL (ref 7–20)
CALCIUM SERPL-MCNC: 8 MG/DL (ref 8.5–10.1)
CHLORIDE SERPL-SCNC: 104 MMOL/L (ref 98–107)
CO2 SERPL-SCNC: 21 MMOL/L (ref 21–32)
CREAT SERPL-MCNC: 0.8 MG/DL (ref 0.6–1)
GFR SERPLBLD BASED ON 1.73 SQ M-ARVRAT: 72.7 ML/MIN
GLOBULIN SER-MCNC: 3.6 G/DL (ref 2.2–3.8)
GLUCOSE SERPL-MCNC: 94 MG/DL (ref 70–99)
MAGNESIUM SERPL-MCNC: 1.7 MG/DL (ref 1.8–2.4)
POTASSIUM SERPL-SCNC: 3.3 MMOL/L (ref 3.5–5.1)
PROT SERPL-MCNC: 6.4 G/DL (ref 6.4–8.2)
SODIUM SERPL-SCNC: 139 MMOL/L (ref 136–145)

## 2020-01-31 RX ADMIN — ACETAMINOPHEN PRN MG: 325 TABLET, FILM COATED ORAL at 09:46

## 2020-01-31 RX ADMIN — BENZONATATE PRN MG: 100 CAPSULE, LIQUID FILLED ORAL at 08:37

## 2020-01-31 RX ADMIN — ALBUTEROL SULFATE SCH MG: 108 AEROSOL, METERED RESPIRATORY (INHALATION) at 15:46

## 2020-01-31 RX ADMIN — ALBUTEROL SULFATE SCH MG: 108 AEROSOL, METERED RESPIRATORY (INHALATION) at 11:21

## 2020-01-31 RX ADMIN — ACETAMINOPHEN PRN MG: 325 TABLET, FILM COATED ORAL at 16:53

## 2020-01-31 RX ADMIN — MAGNESIUM OXIDE TAB 400 MG (241.3 MG ELEMENTAL MG) SCH MG: 400 (241.3 MG) TAB at 20:56

## 2020-01-31 RX ADMIN — BENZONATATE PRN MG: 100 CAPSULE, LIQUID FILLED ORAL at 23:37

## 2020-01-31 RX ADMIN — BENZONATATE PRN MG: 100 CAPSULE, LIQUID FILLED ORAL at 15:45

## 2020-01-31 RX ADMIN — Medication SCH CAP: at 20:55

## 2020-01-31 RX ADMIN — ALBUTEROL SULFATE SCH MG: 108 AEROSOL, METERED RESPIRATORY (INHALATION) at 20:25

## 2020-01-31 NOTE — HP
ADMIT DATE:  2020



HISTORY OF PRESENT ILLNESS:  The patient is a 64-year-old  female

patient, who presented to Emergency Department for evaluation.  She has had

flu-like symptoms for the past 3 days with cough, congestion, myalgia, fever,

also has some nausea, poor appetite, dizziness, but no vomiting.  She has some

mild diarrhea.  Denied any focal pain than diffuse myalgias.  She has not had

any significant shortness of breath.  Denied any otalgia, sore throat.  She has

taken Tylenol for symptoms.  There are no alleviating or exacerbating factors. 

She was evaluated in the Emergency Room.  Her influenza A and B were negative. 

Her white cell count was normal.  Her chemistry was unremarkable; however, chest

x-ray showed that she has right lower lobe infiltrate and therefore, the patient

was admitted with diagnosis of community-acquired pneumonia, started on IV

Zithromax and ceftriaxone.



PAST MEDICAL HISTORY:  Significant for hypertension, gastroesophageal reflux

disease and esophageal stricture.



PAST SURGICAL HISTORY:  Significant for lumbar laminectomy and diskectomy, right

carpal tunnel release, left knee meniscal tear surgery and esophageal stricture

dilatation.



ALLERGIES:  She is allergic to HYDROCODONE AND TRAMADOL.



MEDICATIONS:  She is currently on following medications:  She is on amlodipine

2.5 mg once a day, omeprazole 40 mg once a day and benzonatate 100 to 200 mg 3

times a day.



FAMILY HISTORY:  She has 6 brothers, 2 full brothers, older and both are

, one with cancer, the other one with trauma, one half-brother and 3

step brothers.  She has no sisters.  Both parents are .



SOCIAL HISTORY:  She is , has 2 sons and 1 daughter.  She never smoked,

does not drink alcohol or use any recreational drugs.  She is retired as a

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



REVIEW OF SYSTEMS:  The patient denied any blurring of vision, cataract,

glaucoma or macular degeneration.  Denied any earache, tinnitus or sensorineural

deafness.  Denied any nosebleeds, stuffy nose or postnasal drip.  Denied any

sore throat, sore tongue, toothache, hoarseness of voice or difficulty

swallowing.  She has some nausea, but no vomiting.  She has also mild diarrhea,

but denied any hematemesis, melena or hematochezia.  Denied any dysuria,

frequency or hematuria.  Denied any chest pain, shortness of breath, orthopnea,

paroxysmal nocturnal dyspnea.  She did have cough with scanty greenish sputum. 

Did have some fever.



PHYSICAL EXAMINATION:

GENERAL:  On arrival to the Emergency Room, the patient looked slightly

tachypneic, but no pallor, jaundice, cyanosis or thyromegaly.  No jugular venous

distention.  No limb edema.

VITAL SIGNS:  Her heart rate was 113, blood pressure was 121/73, temperature was

98.8, respiratory rate was 22 and oxygen saturation was 97%.

HEAD, EYES, EARS, NOSE AND THROAT:  Showed normocephalic, atraumatic.

NECK:  Supple.

HEART:  Showed normal first and second heart sounds.  No gallop, rub or murmur.

CHEST:  Shows central trachea, equal bilateral expansion, air entry, vesicular

sounds with crepitation mostly in the right side posteriorly.  No rhonchi.

ABDOMEN:  Scaphoid, soft, nontender.

NEUROLOGIC:  She is awake, alert, responding appropriately.  All cranial nerves

intact.

EXTREMITIES:  She moves extremities without difficulty.  She ambulates without

assistance or assistive devices.



LABORATORY DATA:  Her white cell count was 10,000, hemoglobin 14.7, hematocrit

44, MCV 94 and platelet count 237,000.  Her chemistry showed a serum sodium 137,

potassium 3.8, chloride 102, bicarbonate 24, anion gap of 11, BUN 19, creatinine

1.1, estimated GFR was 50 mL per minute.  Her glucose was 100, lactic acid was

1.4, calcium was 9.6.  Total bilirubin, AST slightly elevated.  ALT and alkaline

phosphatase is normal.  Total protein was 8.2, albumin was 4.4.



ASSESSMENT AND PLAN:  The patient was admitted with community-acquired

pneumonia.  Her influenza A and B were negative.  With blood cultures, we will

also arrange for sputum culture and she was started on Rocephin and Zithromax.





______________________________

GAURI WAN MD



DR:  ASH/silvano  JOB#:  [id number] / [id number]

DD:  2020 13:10  DT:  2020 13:45

## 2020-01-31 NOTE — NUR
New order to recheck CMP, Mag and administer PRN Oxycodone 5mf Q6H for pain also ordered. 
Patient is currently running fever of 101.7. PRN acetaminophen administered per order.

## 2020-01-31 NOTE — PN
DATE:  01/31/2020



SUBJECTIVE:  The patient is resting flat, sleeping comfortably, in no apparent

distress.  On questioning her, she stated that she is feeling a little bit

better.  She continued to have generalized aches and pains, had cough with

scanty yellow greenish sputum.  Denied any chest pain or shortness of breath and

did have a low-grade fever this morning up to 101.1.



PHYSICAL EXAMINATION:

GENERAL:  When I examined her, she looked well and was clearly in no apparent

respiratory distress.  No pallor, jaundice, cyanosis, or thyromegaly.  No

jugular venous distension.  No lower limb edema.

VITAL SIGNS:  Her heart rate was 109, blood pressure was 113/70, temperature was

100.9, respiratory rate was 18 and oxygen saturation was 94%.

HEAD, EYES, EARS, NOSE AND THROAT:  Showed normocephalic, atraumatic.

NECK:  Supple.

CARDIAC:  Normal first and second heart sounds.  No gallop or murmur.

CHEST:  Shows central trachea, equal bilateral expansion, air entry, vesicular

sounds with crepitation mostly on the right side posteriorly.  I could not

appreciate any rhonchi.

ABDOMEN:  Distended, soft, nontender.

NEUROLOGIC:  She is awake, alert, responding appropriately.  She ambulates

without assistance or assistive devices.



LABORATORY DATA:  She has no lab works done this morning.



ASSESSMENT:

1.  Community-acquired pneumonia for which continued her on Zithromax and

Rocephin.

2.  Hypertension.

3.  Gastroesophageal reflux disease.





______________________________

GAURI WAN MD



DR:  ASH/silvano  JOB#:  [id number] / [id number]

DD:  01/31/2020 13:13  DT:  01/31/2020 20:07

## 2020-02-01 VITALS — SYSTOLIC BLOOD PRESSURE: 99 MMHG | DIASTOLIC BLOOD PRESSURE: 66 MMHG

## 2020-02-01 VITALS — DIASTOLIC BLOOD PRESSURE: 66 MMHG | SYSTOLIC BLOOD PRESSURE: 102 MMHG

## 2020-02-01 VITALS — DIASTOLIC BLOOD PRESSURE: 78 MMHG | SYSTOLIC BLOOD PRESSURE: 123 MMHG

## 2020-02-01 VITALS — SYSTOLIC BLOOD PRESSURE: 114 MMHG | DIASTOLIC BLOOD PRESSURE: 78 MMHG

## 2020-02-01 LAB
ALBUMIN SERPL-MCNC: 2.6 G/DL (ref 3.4–5)
ALBUMIN/GLOB SERPL: 0.7 {RATIO} (ref 1–1.7)
ALP SERPL-CCNC: 58 U/L (ref 46–116)
ALT SERPL-CCNC: 41 U/L (ref 14–59)
ANION GAP SERPL CALC-SCNC: 9 MMOL/L (ref 6–14)
AST SERPL-CCNC: 19 U/L (ref 15–37)
BILIRUB SERPL-MCNC: 0.6 MG/DL (ref 0.2–1)
BUN/CREAT SERPL: 10 (ref 6–20)
CA-I SERPL ISE-MCNC: 7 MG/DL (ref 7–20)
CALCIUM SERPL-MCNC: 7.9 MG/DL (ref 8.5–10.1)
CHLORIDE SERPL-SCNC: 104 MMOL/L (ref 98–107)
CO2 SERPL-SCNC: 25 MMOL/L (ref 21–32)
CREAT SERPL-MCNC: 0.7 MG/DL (ref 0.6–1)
ERYTHROCYTE [DISTWIDTH] IN BLOOD BY AUTOMATED COUNT: 12.6 % (ref 11.5–14.5)
GFR SERPLBLD BASED ON 1.73 SQ M-ARVRAT: 84.8 ML/MIN
GLOBULIN SER-MCNC: 3.5 G/DL (ref 2.2–3.8)
GLUCOSE SERPL-MCNC: 92 MG/DL (ref 70–99)
HCT VFR BLD CALC: 33.9 % (ref 36–47)
HGB BLD-MCNC: 11.5 G/DL (ref 12–15.5)
MCH RBC QN AUTO: 32 PG (ref 25–35)
MCHC RBC AUTO-ENTMCNC: 34 G/DL (ref 31–37)
MCV RBC AUTO: 93 FL (ref 79–100)
PLATELET # BLD AUTO: 206 X10^3/UL (ref 140–400)
POTASSIUM SERPL-SCNC: 3.5 MMOL/L (ref 3.5–5.1)
PROT SERPL-MCNC: 6.1 G/DL (ref 6.4–8.2)
RBC # BLD AUTO: 3.64 X10^6/UL (ref 3.5–5.4)
SODIUM SERPL-SCNC: 138 MMOL/L (ref 136–145)
WBC # BLD AUTO: 6.8 X10^3/UL (ref 4–11)

## 2020-02-01 RX ADMIN — AZITHROMYCIN SCH MG: 250 TABLET, FILM COATED ORAL at 09:16

## 2020-02-01 RX ADMIN — MAGNESIUM OXIDE TAB 400 MG (241.3 MG ELEMENTAL MG) SCH MG: 400 (241.3 MG) TAB at 21:15

## 2020-02-01 RX ADMIN — BENZONATATE PRN MG: 100 CAPSULE, LIQUID FILLED ORAL at 09:18

## 2020-02-01 RX ADMIN — ACETAMINOPHEN PRN MG: 325 TABLET, FILM COATED ORAL at 15:33

## 2020-02-01 RX ADMIN — ALBUTEROL SULFATE SCH MG: 108 AEROSOL, METERED RESPIRATORY (INHALATION) at 10:06

## 2020-02-01 RX ADMIN — ACETAMINOPHEN PRN MG: 325 TABLET, FILM COATED ORAL at 23:20

## 2020-02-01 RX ADMIN — MAGNESIUM OXIDE TAB 400 MG (241.3 MG ELEMENTAL MG) SCH MG: 400 (241.3 MG) TAB at 09:16

## 2020-02-01 RX ADMIN — Medication SCH CAP: at 21:15

## 2020-02-01 RX ADMIN — BENZONATATE PRN MG: 100 CAPSULE, LIQUID FILLED ORAL at 15:33

## 2020-02-01 RX ADMIN — BENZONATATE PRN MG: 100 CAPSULE, LIQUID FILLED ORAL at 21:15

## 2020-02-01 RX ADMIN — ALBUTEROL SULFATE SCH MG: 108 AEROSOL, METERED RESPIRATORY (INHALATION) at 16:00

## 2020-02-01 RX ADMIN — ACETAMINOPHEN PRN MG: 325 TABLET, FILM COATED ORAL at 07:21

## 2020-02-01 RX ADMIN — Medication SCH CAP: at 09:16

## 2020-02-01 RX ADMIN — ALBUTEROL SULFATE SCH MG: 108 AEROSOL, METERED RESPIRATORY (INHALATION) at 04:50

## 2020-02-01 RX ADMIN — ALBUTEROL SULFATE SCH MG: 108 AEROSOL, METERED RESPIRATORY (INHALATION) at 20:00

## 2020-02-02 VITALS
SYSTOLIC BLOOD PRESSURE: 126 MMHG | DIASTOLIC BLOOD PRESSURE: 81 MMHG | SYSTOLIC BLOOD PRESSURE: 126 MMHG | SYSTOLIC BLOOD PRESSURE: 126 MMHG | SYSTOLIC BLOOD PRESSURE: 126 MMHG | DIASTOLIC BLOOD PRESSURE: 81 MMHG | SYSTOLIC BLOOD PRESSURE: 126 MMHG | DIASTOLIC BLOOD PRESSURE: 81 MMHG | DIASTOLIC BLOOD PRESSURE: 81 MMHG | DIASTOLIC BLOOD PRESSURE: 81 MMHG

## 2020-02-02 LAB
ANION GAP SERPL CALC-SCNC: 14 MMOL/L (ref 6–14)
CA-I SERPL ISE-MCNC: 10 MG/DL (ref 7–20)
CALCIUM SERPL-MCNC: 8.5 MG/DL (ref 8.5–10.1)
CHLORIDE SERPL-SCNC: 103 MMOL/L (ref 98–107)
CO2 SERPL-SCNC: 25 MMOL/L (ref 21–32)
CREAT SERPL-MCNC: 0.5 MG/DL (ref 0.6–1)
GFR SERPLBLD BASED ON 1.73 SQ M-ARVRAT: 125 ML/MIN
GLUCOSE SERPL-MCNC: 77 MG/DL (ref 70–99)
POTASSIUM SERPL-SCNC: 2.9 MMOL/L (ref 3.5–5.1)
SODIUM SERPL-SCNC: 142 MMOL/L (ref 136–145)

## 2020-02-02 RX ADMIN — MAGNESIUM OXIDE TAB 400 MG (241.3 MG ELEMENTAL MG) SCH MG: 400 (241.3 MG) TAB at 07:55

## 2020-02-02 RX ADMIN — ALBUTEROL SULFATE SCH MG: 108 AEROSOL, METERED RESPIRATORY (INHALATION) at 05:25

## 2020-02-02 RX ADMIN — BENZONATATE PRN MG: 100 CAPSULE, LIQUID FILLED ORAL at 07:55

## 2020-02-02 RX ADMIN — ACETAMINOPHEN PRN MG: 325 TABLET, FILM COATED ORAL at 07:55

## 2020-02-02 RX ADMIN — ALBUTEROL SULFATE SCH MG: 108 AEROSOL, METERED RESPIRATORY (INHALATION) at 10:35

## 2020-02-02 RX ADMIN — Medication SCH CAP: at 07:55

## 2020-02-02 RX ADMIN — AZITHROMYCIN SCH MG: 250 TABLET, FILM COATED ORAL at 07:55

## 2020-02-02 NOTE — PN
DATE:  



SUBJECTIVE:  The patient is resting, slightly propped up in bed, somewhat

lethargic, arousable.  She continued to have cough and chest pain.



PHYSICAL EXAMINATION:

GENERAL:  When I examined her, she was somewhat pale, but no jaundice, cyanosis,

or thyromegaly.  No jugular venous distension.  No lower limb edema.

VITAL SIGNS:  Her heart rate was 86, blood pressure was 123/78, temperature was

100.1, respiratory rate was 20, and oxygen saturation was 95% on room air.

HEAD, EYES, EARS, NOSE AND THROAT:  Showed normocephalic, atraumatic.

NECK:  Supple.

HEART:  Showed normal first and second heart sounds.  No gallop or murmur.

CHEST:  Shows central trachea, equal bilateral expansion, air entry, vesicular

sounds with crepitation mostly in the right side posteriorly.

ABDOMEN:  Scaphoid, soft.

NEUROLOGIC:  She is grossly intact.



Her intake was 2675, no output was recorded.



LABORATORY DATA:  Her lab work this morning showed a white cell count is down to

6800, hemoglobin 11.5, hematocrit 33.9, MCV 93, and platelet count 206,000.  Her

chemistry showed a serum sodium 138, potassium 3.5, chloride 104, bicarbonate

25, anion gap of 9, BUN 7, creatinine 0.7.  Estimated GFR was 85 mL per minute. 

Her glucose ____, calcium was 7.9.  Total bilirubin, AST, ALT, alkaline

phosphatase were normal.  Total protein 6.1, albumin was 2.6.  So far, her blood

cultures showed no growth after 24 hours.  Her influenza A and B were negative.



ASSESSMENT:

1.  Community-acquired pneumonia for which she continues to be on Zithromax and

Rocephin.

2.  Hypertension.

3.  Gastroesophageal reflux disease.



PLAN:  To continue with IV Rocephin and Zithromax.  Continue with pain

management.  Continue with nebulized albuterol and Atrovent secondary to spike

in temperature.  I will arrange for her to have CT scan of the chest without

contrast.





______________________________

GAURI AWN MD



DR:  ASH/silvano  JOB#:  [id number] / [id number]

DD:  02/01/2020 10:26  DT:  02/01/2020 23:06

## 2020-02-02 NOTE — NUR
PATIENT IS D/C HOME WITH SELF CARE. IV IS REMOVED AND PATIENTS HAS ALL BELONGINGS AT TIME OF 
D/C. DISCHARGE EDUCATION GIVEN TO PATIENT REGARDING PRESCRIPTIONS, REASON FOR ADMISSION, AS 
WELL AS FOLLOW UP. PATIENT AMBULATED OFF UNIT ACCOMPANIED BY .

## 2020-07-21 ENCOUNTER — HOSPITAL ENCOUNTER (OUTPATIENT)
Dept: HOSPITAL 63 - CT | Age: 65
Discharge: HOME | End: 2020-07-21
Attending: SPECIALIST
Payer: COMMERCIAL

## 2020-07-21 DIAGNOSIS — R07.82: Primary | ICD-10-CM

## 2020-07-21 PROCEDURE — 71275 CT ANGIOGRAPHY CHEST: CPT

## 2020-07-21 NOTE — RAD
Examination: CT ANGIOGRAPHY CHEST

 

History: Reason: CHEST PAIN /  

 

Comparison/Correlation: 1/30/2020 2V chest x-ray exam

 

Findings: Axial images of the chest were obtained following IV contrast 

according to pulmonary arteriography protocol. Sagittal and coronal 

reformatted images were provided. Maximum intensity projection images were

provided.

 

Pulmonary arterial vasculature is normal with no thromboembolic disease. 

Thoracic aortic morphology is unremarkable. Thoracic aorta is not 

opacified for arteriographic assessment. No infiltrates. No pneumothorax. 

No enlarged thoracic lymph nodes. No suspicious pulmonary nodules or 

masses. Partially visualized upper abdomen is unremarkable. Bony 

structures are unremarkable.

 

 

Impression:

No PE. No infiltrate. Unremarkable exam.

 

 

 

PQRS Compliance Statement:

 

One or more of the following individualized dose reduction techniques were

utilized for this examination:  

1. Automated exposure control  

2. Adjustment of the mA and/or kV according to patient size  

3. Use of iterative reconstruction technique

 

Electronically signed by: Armani Zapata MD (7/21/2020 5:41 PM) [facility]

## 2020-09-02 ENCOUNTER — HOSPITAL ENCOUNTER (OUTPATIENT)
Dept: HOSPITAL 63 - RAD | Age: 65
Discharge: HOME | End: 2020-09-02
Attending: SPECIALIST
Payer: COMMERCIAL

## 2020-09-02 DIAGNOSIS — M79.605: Primary | ICD-10-CM

## 2020-09-02 DIAGNOSIS — M25.562: ICD-10-CM

## 2020-09-02 PROCEDURE — 73590 X-RAY EXAM OF LOWER LEG: CPT

## 2020-09-02 NOTE — RAD
PROCEDURE: TIBIA FIBULA LEFT

 

CLINICAL INDICATION / HISTORY: Reason: LEFT KNEE AND LEG PAIN / Spl. 

Instructions:  / History: .

 

TECHNIQUE: AP and lateral views of the left tibia and fibula.

 

COMPARISON: None

 

FINDINGS: AP and lateral views of the left tibia and fibula show no acute 

fracture, dislocation or bone destruction. The soft tissues are normal.

 

IMPRESSION: Normal left tibia and fibula.

 

Electronically signed by: Earnestine Paredes MD (9/2/2020 3:47 PM) 

[id number]

## 2020-09-28 ENCOUNTER — HOSPITAL ENCOUNTER (OUTPATIENT)
Dept: HOSPITAL 63 - DXRAD | Age: 65
Discharge: HOME | End: 2020-09-28
Attending: PHYSICIAN ASSISTANT
Payer: COMMERCIAL

## 2020-09-28 DIAGNOSIS — M17.12: Primary | ICD-10-CM

## 2020-09-28 PROCEDURE — 73562 X-RAY EXAM OF KNEE 3: CPT

## 2020-09-28 NOTE — RAD
EXAM: Left knee, 3 views.

 

HISTORY: Pain.

 

COMPARISON: None.

 

FINDINGS: 3 views of the left knee are obtained. There is medial 

compartment joint space narrowing, subchondral sclerosis, spurring and 

vacuum phenomenon. There is no fracture, dislocation or subluxation. There

is no joint effusion.

 

IMPRESSION: Mild to moderate medial compartment osteoarthritis of the left

knee.

 

Electronically signed by: Cydney Alvarado MD (9/28/2020 3:59 PM) [id number]

## 2020-10-16 ENCOUNTER — HOSPITAL ENCOUNTER (OUTPATIENT)
Dept: HOSPITAL 61 - KCIC MRI | Age: 65
End: 2020-10-16
Attending: ORTHOPAEDIC SURGERY
Payer: COMMERCIAL

## 2020-10-16 DIAGNOSIS — M25.462: ICD-10-CM
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
## 2020-10-16 DIAGNOSIS — M17.12: ICD-10-CM
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
## 2020-10-16 DIAGNOSIS — S83.242A: Primary | ICD-10-CM

## 2020-10-16 DIAGNOSIS — M79.4: ICD-10-CM

## 2020-10-16 PROCEDURE — 73721 MRI JNT OF LWR EXTRE W/O DYE: CPT

## 2020-10-16 NOTE — [facility]
EXAM: MRI LEFT KNEE

 

DATE: 10/16/2020 2:45 PM

 

CLINICAL INDICATION: LEFT KNEE PAIN / Spl. Instructions: Previous meniscal

surgery 2015 / History: Left medial knee pain in last few months. Painful 

to fully straighten.

 

COMPARISON: None.

 

TECHNIQUE: Multiplanar, multisequence MRI of the left knee was performed 

without contrast.

 

FINDINGS:

Small knee joint effusion. No Baker's cyst.

 

ACL and PCL are intact. MCL is intact although bowed by medial meniscus. 

Fibular collateral ligament, biceps femoris, IT band and popliteus are 

intact. Extensor mechanism is intact. Suprapatellar fat pad edema may be 

seen with anterior knee pain/impingement.

 

There is diffuse degeneration/maceration of the posterior horn medial 

meniscus with suspected full-thickness radial component.

Lateral meniscus appears intact.

 

Chondral effacement medial compartment with marrow edema in cystic change 

within the medial tibial plateau and femoral condyle.

 

No acute fracture or osteonecrosis.

 

IMPRESSION: 

Severe left knee joint osteoarthritis with chondral effacement of the 

medial compartment, marrow edema and cystic change. Posterior horn medial 

meniscus radial tear with degeneration/maceration and extrusion of the 

body segment is also seen.

 

Electronically signed by: Gustavo Mccrary MD (10/16/2020 6:32 PM) [facility]

## 2021-04-14 ENCOUNTER — HOSPITAL ENCOUNTER (OUTPATIENT)
Dept: HOSPITAL 63 - CT | Age: 66
End: 2021-04-14
Attending: SPECIALIST
Payer: COMMERCIAL

## 2021-04-14 DIAGNOSIS — R07.89: ICD-10-CM

## 2021-04-14 DIAGNOSIS — R22.1: Primary | ICD-10-CM

## 2021-04-14 PROCEDURE — 70490 CT SOFT TISSUE NECK W/O DYE: CPT

## 2021-04-14 PROCEDURE — 71250 CT THORAX DX C-: CPT

## 2021-04-14 NOTE — RAD
Exam: CT neck chest without contrast



INDICATION: Localized swelling, mass



TECHNIQUE: Sequential axial images through the neck and chest obtained without IV contrast. Sagittal 
and coronal reformatted images were reconstructed from the axial data and reviewed.



Comparisons: CT chest 7/21/2020



FINDINGS:



NECK:

Visualized intracranial structures are unremarkable. Globes and intraorbital contents are normal.



Nasopharynx, oropharynx, hypopharynx and larynx are unremarkable. Thyroid and salivary glands are wit
hin normal limits.



No enlarged cervical lymph nodes are identified.



 Soft tissues of the neck are unremarkable. No subcutaneous emphysema is identified.



No suspicious osseous lesions or acute fractures.



CHEST:

No enlarged mediastinal lymph nodes are identified.



Heart size is normal. No pericardial effusion. Thoracic aorta has a normal course and caliber. Pulmon
livia artery is not enlarged.



Airways are patent. No consolidation or pneumothorax. No suspicious lung nodules are identified.



No pleural effusion or thickening.



Visualized upper abdomen is unremarkable.



No suspicious osseous lesions or acute fractures.



IMPRESSION:

1.  No subcutaneous emphysema or suspicious mass identified.

2.  Unremarkable evaluation of the neck and chest.





Exposure: One or more of the following in the visualized dose reduction techniques were utilized for 
this examination:

1.  Automated exposure control

2.  Adjustment of the MA and/or KV according to patient size

3.  Use of iterative of reconstructive technique



Electronically signed by: Yasemin Mcdermott MD (4/14/2021 4:29 PM) [facility]

## 2021-10-19 ENCOUNTER — HOSPITAL ENCOUNTER (EMERGENCY)
Dept: HOSPITAL 63 - ER | Age: 66
Discharge: HOME | End: 2021-10-19
Payer: COMMERCIAL

## 2021-10-19 VITALS — SYSTOLIC BLOOD PRESSURE: 137 MMHG | DIASTOLIC BLOOD PRESSURE: 67 MMHG

## 2021-10-19 VITALS — HEIGHT: 66 IN | WEIGHT: 165.35 LBS | BODY MASS INDEX: 26.57 KG/M2
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
## 2021-10-19 DIAGNOSIS — I10: ICD-10-CM
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
## 2021-10-19 DIAGNOSIS — Z88.5: ICD-10-CM

## 2021-10-19 DIAGNOSIS — Z88.6: ICD-10-CM

## 2021-10-19 DIAGNOSIS — S09.90XA: Primary | ICD-10-CM

## 2021-10-19 LAB
ALBUMIN SERPL-MCNC: 3.7 G/DL (ref 3.4–5)
ALBUMIN/GLOB SERPL: 1.2 {RATIO} (ref 1–1.7)
ALP SERPL-CCNC: 72 U/L (ref 46–116)
ALT SERPL-CCNC: 67 U/L (ref 14–59)
ANION GAP SERPL CALC-SCNC: 10 MMOL/L (ref 6–14)
AST SERPL-CCNC: 16 U/L (ref 15–37)
BASOPHILS # BLD AUTO: 0 X10^3/UL (ref 0–0.2)
BASOPHILS NFR BLD: 1 % (ref 0–3)
BILIRUB SERPL-MCNC: 0.4 MG/DL (ref 0.2–1)
BUN/CREAT SERPL: 24 (ref 6–20)
CA-I SERPL ISE-MCNC: 17 MG/DL (ref 7–20)
CALCIUM SERPL-MCNC: 8.9 MG/DL (ref 8.5–10.1)
CHLORIDE SERPL-SCNC: 103 MMOL/L (ref 98–107)
CO2 SERPL-SCNC: 26 MMOL/L (ref 21–32)
CREAT SERPL-MCNC: 0.7 MG/DL (ref 0.6–1)
EOSINOPHIL NFR BLD: 0.1 X10^3/UL (ref 0–0.7)
EOSINOPHIL NFR BLD: 2 % (ref 0–3)
ERYTHROCYTE [DISTWIDTH] IN BLOOD BY AUTOMATED COUNT: 13.3 % (ref 11.5–14.5)
GFR SERPLBLD BASED ON 1.73 SQ M-ARVRAT: 84.5 ML/MIN
GLOBULIN SER-MCNC: 3.2 G/DL (ref 2.2–3.8)
GLUCOSE SERPL-MCNC: 92 MG/DL (ref 70–99)
HCT VFR BLD CALC: 38.7 % (ref 36–47)
HGB BLD-MCNC: 13.1 G/DL (ref 12–15.5)
LYMPHOCYTES # BLD: 2 X10^3/UL (ref 1–4.8)
LYMPHOCYTES NFR BLD AUTO: 33 % (ref 24–48)
MCH RBC QN AUTO: 32 PG (ref 25–35)
MCHC RBC AUTO-ENTMCNC: 34 G/DL (ref 31–37)
MCV RBC AUTO: 95 FL (ref 79–100)
MONO #: 0.5 X10^3/UL (ref 0–1.1)
MONOCYTES NFR BLD: 8 % (ref 0–9)
NEUT #: 3.5 X10^3UL (ref 1.8–7.7)
NEUTROPHILS NFR BLD AUTO: 57 % (ref 31–73)
PLATELET # BLD AUTO: 221 X10^3/UL (ref 140–400)
POTASSIUM SERPL-SCNC: 3.4 MMOL/L (ref 3.5–5.1)
PROT SERPL-MCNC: 6.9 G/DL (ref 6.4–8.2)
RBC # BLD AUTO: 4.08 X10^6/UL (ref 3.5–5.4)
SODIUM SERPL-SCNC: 139 MMOL/L (ref 136–145)
WBC # BLD AUTO: 6.1 X10^3/UL (ref 4–11)

## 2021-10-19 PROCEDURE — 36415 COLL VENOUS BLD VENIPUNCTURE: CPT

## 2021-10-19 PROCEDURE — 96375 TX/PRO/DX INJ NEW DRUG ADDON: CPT

## 2021-10-19 PROCEDURE — 99284 EMERGENCY DEPT VISIT MOD MDM: CPT

## 2021-10-19 PROCEDURE — 85025 COMPLETE CBC W/AUTO DIFF WBC: CPT

## 2021-10-19 PROCEDURE — 96374 THER/PROPH/DIAG INJ IV PUSH: CPT

## 2021-10-19 PROCEDURE — 70450 CT HEAD/BRAIN W/O DYE: CPT

## 2021-10-19 PROCEDURE — 96361 HYDRATE IV INFUSION ADD-ON: CPT

## 2021-10-19 PROCEDURE — 80053 COMPREHEN METABOLIC PANEL: CPT

## 2021-10-19 NOTE — RAD
EXAM: CT Head without IV contrast



CLINICAL HISTORY: Reason: Direct blow to right temple/head, headache / Spl. Instructions:  / History:
 



COMPARISON: None.



TECHNIQUE:

Routine CT of the head without contrast.



PQRS compliance statement - One or more of the following individualized dose reduction techniques wer
e utilized for this study:

1.  Automated exposure control

2.  Adjustment of the mA and/or kV according to patient size

3.  Use of iterative reconstruction technique



FINDINGS:  

There is no evidence of hemorrhage, mass or extra-axial fluid collection.



Grey-white differentiation is maintained with no evidence of edema. 



There is no mass effect or shift of the intracranial structures.



The ventricles, basilar cisterns and cortical sulci are normal in size and configuration for the jaime
ents stated age.



The cerebellum and brainstem are unremarkable.  



The calvarium demonstrates no evidence of fracture or focal lesion.



There is normal aeration of the visualized paranasal sinuses and mastoid air cells.



The visualized portions of the orbits are normal.







IMPRESSION:

No evidence for acute intracranial process.



Electronically signed by: Gustavo Mccrary MD (10/19/2021 9:04 PM) [facility]

## 2021-10-19 NOTE — PHYS DOC
Past History


Past Medical History:  Hypertension


Past Surgical History:  Other


Additional Past Surgical Histo:  CARPAL TUNNEL, THUMB SX, LEFT KNEE


Smoking:  Non-smoker


Alcohol Use:  None





General Adult


EDM:


Chief Complaint:  HEAD INJURY/TRAUMA





HPI:


HPI:


65-year-old female presents with right-sided head pain.  Patient was 

volunteering at a local animal [employer] earlier today around 1 PM when she got 

hit in the right side of the temple with a kennel door.  She has had a headache 

since that time.  She decided to come in this evening because she just cannot 

stop worrying about the possibility she could have internal damage.  There is no

ecchymosis or swelling of the area.  Patient denies any focal deficits.  She 

denies loss of consciousness, nausea, vomiting.  She does not drink alcohol 

daily.  She is not on any blood thinners or antiplatelets.





Review of Systems:


Review of Systems:


Constitutional:  Denies fever or chills 


Eyes:  Denies change in visual acuity 


HENT:  Denies nasal congestion or sore throat 


Respiratory:  Denies cough or shortness of breath 


Cardiovascular:  Denies chest pain or edema 


GI:  Denies abdominal pain, nausea, vomiting, bloody stools or diarrhea 


: Denies dysuria 


Musculoskeletal:  Denies back pain or joint pain 


Integument:  Denies rash 


Neurologic:  Headache. Denies focal weakness or sensory changes 


Endocrine:  Denies polyuria or polydipsia 


Lymphatic:  Denies swollen glands 


Psychiatric:  Denies depression or anxiety





Allergies:


Allergies:





Allergies








Coded Allergies Type Severity Reaction Last Updated Verified


 


  hydrocodone Allergy Unknown  1/30/20 Yes


 


  tramadol Allergy Unknown  1/30/20 Yes











Physical Exam:


PE:





Constitutional: Well developed, well nourished, no acute distress, non-toxic 

appearance. []


HENT: Normocephalic, atraumatic, bilateral external ears normal, oropharynx 

moist, no oral exudates, nose normal. []


Eyes: PERRLA, EOMI, conjunctiva normal, no discharge. [] 


Neck: Normal range of motion, no tenderness, supple, no stridor. [] 


Cardiovascular: Heart rate regular rhythm, no murmur []


Lungs & Thorax:  Bilateral breath sounds clear to auscultation []


Abdomen: Bowel sounds normal, soft, no tenderness, no masses, no pulsatile 

masses. [] 


Skin: Warm, dry, no erythema, no rash. [] 


Back: No tenderness, no CVA tenderness. [] 


Extremities: No tenderness, no cyanosis, no clubbing, ROM intact, no edema. [] 


Neurologic: Alert and oriented X 3, normal motor function, normal sensory 

function, no focal deficits noted. []


Psychologic: Affect normal, judgement normal, mood normal. []





Current Patient Data:


Vital Signs:





                                   Vital Signs








  Date Time  Temp Pulse Resp B/P (MAP) Pulse Ox O2 Delivery O2 Flow Rate FiO2


 


10/19/21 20:36 98.5 78 18 137/67 (90) 97 Room Air  











EKG:


EKG:


[]





Radiology/Procedures:


Radiology/Procedures:


[]


Impressions:


EXAM: CT Head without IV contrast





CLINICAL HISTORY: Reason: Direct blow to right temple/head, headache / Spl. 

Instructions:  / History: 





COMPARISON: None.





TECHNIQUE:


Routine CT of the head without contrast.





[facility] compliance statement - One or more of the following individualized dose 

reduction techniques were utilized for this study:


1.  Automated exposure control


2.  Adjustment of the mA and/or kV according to patient size


3.  Use of iterative reconstruction technique





FINDINGS:  


There is no evidence of hemorrhage, mass or extra-axial fluid collection.





Grey-white differentiation is maintained with no evidence of edema. 





There is no mass effect or shift of the intracranial structures.





The ventricles, basilar cisterns and cortical sulci are normal in size and 

configuration for the patients stated age.





The cerebellum and brainstem are unremarkable.  





The calvarium demonstrates no evidence of fracture or focal lesion.





There is normal aeration of the visualized paranasal sinuses and mastoid air 

cells.





The visualized portions of the orbits are normal.











IMPRESSION:


No evidence for acute intracranial process.





Electronically signed by: Gustavo Mccrary MD (10/19/2021 9:04 PM) [facility]














DICTATED AND SIGNED BY:     GUSTAVO MCCRARY MD


DATE:     10/19/21 2103





CC: LACHELLE MELENDEZ DO; SHAILESH OSUNA MD ~MTH0 0





Heart Score:


C/O Chest Pain:  N/A


Risk Factors:


Risk Factors:  DM, Current or recent (<one month) smoker, HTN, HLP, family 

history of CAD, obesity.


Risk Scores:


Score 0 - 3:  2.5% MACE over next 6 weeks - Discharge Home


Score 4 - 6:  20.3% MACE over next 6 weeks - Admit for Clinical Observation


Score 7 - 10:  72.7% MACE over next 6 weeks - Early Invasive Strategies





Course & Med Decision Making:


Course & Med Decision Making


Pertinent Labs and Imaging studies reviewed. (See chart for details)


The patient's head CT is negative for acute findings.  For her headache I given 

her 1 L normal saline, 10 mg Reglan, 25 mg of Benadryl, 30 mg of Toradol.  After

 period of rest, she is feeling a bit better.  She is stable for discharge at 

this time.


[]





Dragon Disclaimer:


Dragon Disclaimer:


This electronic medical record was generated, in whole or in part, using a voice

 recognition dictation system.





Departure


Departure:


Impression:  


   Primary Impression:  


   Closed head injury without concussion


   Qualified Codes:  S09.90XA - Unspecified injury of head, initial encounter


Disposition:  01 HOME / SELF CARE / HOMELESS


Condition:  STABLE


Referrals:  


SHAILESH OSUNA MD (PCP)


Patient Instructions:  Head Injury, Adult, Easy-to-Read











LACHELLE MELENDEZ DO                 Oct 19, 2021 20:45